# Patient Record
Sex: MALE | Race: WHITE | NOT HISPANIC OR LATINO | URBAN - METROPOLITAN AREA
[De-identification: names, ages, dates, MRNs, and addresses within clinical notes are randomized per-mention and may not be internally consistent; named-entity substitution may affect disease eponyms.]

---

## 2017-09-19 ENCOUNTER — IMPORTED ENCOUNTER (OUTPATIENT)
Dept: URBAN - METROPOLITAN AREA CLINIC 50 | Facility: CLINIC | Age: 67
End: 2017-09-19

## 2018-10-08 ENCOUNTER — IMPORTED ENCOUNTER (OUTPATIENT)
Dept: URBAN - METROPOLITAN AREA CLINIC 50 | Facility: CLINIC | Age: 68
End: 2018-10-08

## 2018-10-10 ENCOUNTER — IMPORTED ENCOUNTER (OUTPATIENT)
Dept: URBAN - METROPOLITAN AREA CLINIC 50 | Facility: CLINIC | Age: 68
End: 2018-10-10

## 2018-10-18 ENCOUNTER — IMPORTED ENCOUNTER (OUTPATIENT)
Dept: URBAN - METROPOLITAN AREA CLINIC 50 | Facility: CLINIC | Age: 68
End: 2018-10-18

## 2018-10-19 ENCOUNTER — IMPORTED ENCOUNTER (OUTPATIENT)
Dept: URBAN - METROPOLITAN AREA CLINIC 50 | Facility: CLINIC | Age: 68
End: 2018-10-19

## 2018-10-23 ENCOUNTER — IMPORTED ENCOUNTER (OUTPATIENT)
Dept: URBAN - METROPOLITAN AREA CLINIC 50 | Facility: CLINIC | Age: 68
End: 2018-10-23

## 2018-10-29 ENCOUNTER — IMPORTED ENCOUNTER (OUTPATIENT)
Dept: URBAN - METROPOLITAN AREA CLINIC 50 | Facility: CLINIC | Age: 68
End: 2018-10-29

## 2018-10-29 NOTE — PATIENT DISCUSSION
"""S/P IOL OS: Tecnis ZCB00 +16.50 (Target: [place]) +Femto/Arcs +Omidria.  Continue post operative instructions and drops per schedule. "" ""Decrease Pred-Gati-Brom left eye twice a day

## 2018-11-13 ENCOUNTER — IMPORTED ENCOUNTER (OUTPATIENT)
Dept: URBAN - METROPOLITAN AREA CLINIC 50 | Facility: CLINIC | Age: 68
End: 2018-11-13

## 2018-11-21 ENCOUNTER — IMPORTED ENCOUNTER (OUTPATIENT)
Dept: URBAN - METROPOLITAN AREA CLINIC 50 | Facility: CLINIC | Age: 68
End: 2018-11-21

## 2018-11-21 NOTE — PATIENT DISCUSSION
"""S/P IOL OD: Tecnis ZCB00 +18.0 (Target: -0.25) +Femto/Arcs +Omidria. Continue post operative instructions and drops per schedule.  """

## 2018-12-10 ENCOUNTER — IMPORTED ENCOUNTER (OUTPATIENT)
Dept: URBAN - METROPOLITAN AREA CLINIC 50 | Facility: CLINIC | Age: 68
End: 2018-12-10

## 2018-12-10 NOTE — PATIENT DISCUSSION
"""S/P IOL OU: OD: Tecnis ZCB00 +18.0 (Target: -0.25)Femto/ArcsOmidria. OS: Tecnis ZCB00 +16.50 (Target: [place])/Arcs. "

## 2019-04-01 ENCOUNTER — IMPORTED ENCOUNTER (OUTPATIENT)
Dept: URBAN - METROPOLITAN AREA CLINIC 50 | Facility: CLINIC | Age: 69
End: 2019-04-01

## 2021-04-18 ASSESSMENT — TONOMETRY
OS_IOP_MMHG: 15
OD_IOP_MMHG: 17
OS_IOP_MMHG: 16
OS_IOP_MMHG: 14
OD_IOP_MMHG: 16
OS_IOP_MMHG: 42
OD_IOP_MMHG: 22
OD_IOP_MMHG: 16
OD_IOP_MMHG: 15
OS_IOP_MMHG: 22
OS_IOP_MMHG: 12
OD_IOP_MMHG: 12
OD_IOP_MMHG: 16
OS_IOP_MMHG: 16
OD_IOP_MMHG: 21
OS_IOP_MMHG: 15
OS_IOP_MMHG: 14

## 2021-04-18 ASSESSMENT — VISUAL ACUITY
OD_CC: J1+@ 17 IN
OS_BAT: 20/40
OD_PH: 20/30
OD_BAT: 20/40
OS_SC: 20/30
OS_BAT: 20/40
OS_OTHER: 20/40. 20/40.
OS_BAT: 20/40
OD_BAT: 20/40
OD_CC: J1+
OS_CC: J1+
OS_SC: 20/25
OS_CC: J1+
OS_SC: 20/25-1
OD_OTHER: 20/40. 20/50.
OS_OTHER: 20/40. 20/40.
OS_PH: 20/25
OS_OTHER: 20/40. 20/40.
OS_BAT: 20/40
OS_OTHER: 20/40. 20/40.
OS_BAT: 20/40
OD_BAT: 20/40
OD_CC: 20/40
OD_CC: J1+
OS_OTHER: 20/40. 20/40.
OD_OTHER: 20/40. 20/50.
OS_CC: J1+@ 17 IN
OD_OTHER: 20/40. 20/50.
OD_OTHER: 20/40. 20/50.
OD_SC: 20/25
OD_SC: 20/30
OD_OTHER: 20/40. 20/50.
OD_SC: 20/30
OS_CC: 20/400
OS_SC: 20/25-1
OD_SC: 20/20-
OS_SC: 20/40
OD_SC: 20/60
OD_BAT: 20/40
OD_BAT: 20/40
OS_SC: 20/20
OS_CC: 20/30
OD_SC: 20/20
OD_SC: 20/25-2

## 2024-03-06 ENCOUNTER — COMPREHENSIVE EXAM (OUTPATIENT)
Dept: URBAN - METROPOLITAN AREA CLINIC 48 | Facility: LOCATION | Age: 74
End: 2024-03-06

## 2024-03-06 DIAGNOSIS — H26.493: ICD-10-CM

## 2024-03-06 DIAGNOSIS — H35.372: ICD-10-CM

## 2024-03-06 DIAGNOSIS — H43.813: ICD-10-CM

## 2024-03-06 PROCEDURE — 92134 CPTRZ OPH DX IMG PST SGM RTA: CPT

## 2024-03-06 PROCEDURE — 99214 OFFICE O/P EST MOD 30 MIN: CPT

## 2024-03-06 ASSESSMENT — TONOMETRY
OS_IOP_MMHG: 12
OD_IOP_MMHG: 12

## 2024-03-06 ASSESSMENT — KERATOMETRY
OS_AXISANGLE2_DEGREES: 90
OD_AXISANGLE_DEGREES: 20
OS_K1POWER_DIOPTERS: 46.25
OD_AXISANGLE2_DEGREES: 110
OD_K1POWER_DIOPTERS: 45.25
OS_AXISANGLE_DEGREES: 180
OS_K2POWER_DIOPTERS: 46.25
OD_K2POWER_DIOPTERS: 45.50

## 2024-03-06 ASSESSMENT — VISUAL ACUITY
OD_GLARE: 20/25
OD_SC: 20/20-1
OD_GLARE: 20/40
OS_GLARE: 20/30-1
OU_SC: 20/20-1
OU_CC: J1+@14"
OS_GLARE: 20/20
OS_SC: 20/20-1

## 2025-03-24 NOTE — PATIENT DISCUSSION
"""[place] with IOL OS: 10/23/2018 [place] ZCB00 +16.50 Target: [facility] Continue pregabalin, sertraline, trazodone

## 2025-04-22 ENCOUNTER — COMPREHENSIVE EXAM (OUTPATIENT)
Age: 75
End: 2025-04-22

## 2025-04-22 DIAGNOSIS — H35.372: ICD-10-CM

## 2025-04-22 DIAGNOSIS — H57.813: ICD-10-CM

## 2025-04-22 DIAGNOSIS — H26.493: ICD-10-CM

## 2025-04-22 DIAGNOSIS — H43.813: ICD-10-CM

## 2025-04-22 DIAGNOSIS — H16.223: ICD-10-CM

## 2025-04-22 PROCEDURE — 99214 OFFICE O/P EST MOD 30 MIN: CPT

## 2025-04-22 PROCEDURE — 92134 CPTRZ OPH DX IMG PST SGM RTA: CPT
